# Patient Record
Sex: FEMALE | Race: WHITE | NOT HISPANIC OR LATINO | Employment: OTHER | URBAN - METROPOLITAN AREA
[De-identification: names, ages, dates, MRNs, and addresses within clinical notes are randomized per-mention and may not be internally consistent; named-entity substitution may affect disease eponyms.]

---

## 2023-10-01 ENCOUNTER — HOSPITAL ENCOUNTER (OUTPATIENT)
Facility: CLINIC | Age: 68
Setting detail: OBSERVATION
Discharge: HOME | End: 2023-10-02
Attending: EMERGENCY MEDICINE | Admitting: HOSPITALIST

## 2023-10-01 ENCOUNTER — APPOINTMENT (OUTPATIENT)
Dept: ULTRASOUND IMAGING | Facility: CLINIC | Age: 68
End: 2023-10-01
Attending: EMERGENCY MEDICINE

## 2023-10-01 DIAGNOSIS — N93.9 VAGINAL BLEEDING: ICD-10-CM

## 2023-10-01 DIAGNOSIS — R93.89 ENDOMETRIAL THICKENING ON ULTRASOUND: ICD-10-CM

## 2023-10-01 DIAGNOSIS — D25.9 UTERINE LEIOMYOMA, UNSPECIFIED LOCATION: ICD-10-CM

## 2023-10-01 LAB
ABO/RH(D): NORMAL
ALBUMIN SERPL BCG-MCNC: 4.5 G/DL (ref 3.5–5.2)
ALP SERPL-CCNC: 80 U/L (ref 35–104)
ALT SERPL W P-5'-P-CCNC: 13 U/L (ref 0–50)
ANION GAP BLD CALCULATED.3IONS-SCNC: 18 MMOL/L (ref 3–14)
ANION GAP SERPL CALCULATED.3IONS-SCNC: 13 MMOL/L (ref 7–15)
ANTIBODY SCREEN: NEGATIVE
AST SERPL W P-5'-P-CCNC: 17 U/L (ref 0–45)
BASOPHILS # BLD AUTO: 0.1 10E3/UL (ref 0–0.2)
BASOPHILS NFR BLD AUTO: 1 %
BILIRUB SERPL-MCNC: 0.3 MG/DL
BUN SERPL-MCNC: 13 MG/DL (ref 7–30)
BUN SERPL-MCNC: 13.2 MG/DL (ref 8–23)
CA-I BLD-MCNC: 4.5 MG/DL (ref 4.4–5.2)
CALCIUM SERPL-MCNC: 9.2 MG/DL (ref 8.8–10.2)
CHLORIDE BLD-SCNC: 99 MMOL/L (ref 94–109)
CHLORIDE SERPL-SCNC: 93 MMOL/L (ref 98–107)
CO2 BLD-SCNC: 22 MMOL/L (ref 20–32)
CREAT BLD-MCNC: 0.9 MG/DL (ref 0.5–1)
CREAT SERPL-MCNC: 0.76 MG/DL (ref 0.51–0.95)
DEPRECATED HCO3 PLAS-SCNC: 25 MMOL/L (ref 22–29)
EGFRCR SERPLBLD CKD-EPI 2021: 84 ML/MIN/1.73M2
EOSINOPHIL # BLD AUTO: 0.3 10E3/UL (ref 0–0.7)
EOSINOPHIL NFR BLD AUTO: 4 %
ERYTHROCYTE [DISTWIDTH] IN BLOOD BY AUTOMATED COUNT: 12.3 % (ref 10–15)
ERYTHROCYTE [DISTWIDTH] IN BLOOD BY AUTOMATED COUNT: 12.5 % (ref 10–15)
ETHANOL SERPL-MCNC: 0.21 G/DL
GLUCOSE BLD-MCNC: 102 MG/DL (ref 70–99)
GLUCOSE SERPL-MCNC: 119 MG/DL (ref 70–99)
HCT VFR BLD AUTO: 32.7 % (ref 35–47)
HCT VFR BLD AUTO: 35.7 % (ref 35–47)
HCT VFR BLD CALC: 32 % (ref 35–47)
HEMOCCULT STL QL: NEGATIVE
HGB BLD-MCNC: 10.8 G/DL (ref 11.7–15.7)
HGB BLD-MCNC: 10.8 G/DL (ref 11.7–15.7)
HGB BLD-MCNC: 10.9 G/DL (ref 11.7–15.7)
HGB BLD-MCNC: 12.1 G/DL (ref 11.7–15.7)
HOLD SPECIMEN: NORMAL
IMM GRANULOCYTES # BLD: 0 10E3/UL
IMM GRANULOCYTES NFR BLD: 0 %
LYMPHOCYTES # BLD AUTO: 4.2 10E3/UL (ref 0.8–5.3)
LYMPHOCYTES NFR BLD AUTO: 54 %
MAGNESIUM SERPL-MCNC: 1.7 MG/DL (ref 1.7–2.3)
MCH RBC QN AUTO: 31.7 PG (ref 26.5–33)
MCH RBC QN AUTO: 32 PG (ref 26.5–33)
MCHC RBC AUTO-ENTMCNC: 33 G/DL (ref 31.5–36.5)
MCHC RBC AUTO-ENTMCNC: 33.9 G/DL (ref 31.5–36.5)
MCV RBC AUTO: 94 FL (ref 78–100)
MCV RBC AUTO: 96 FL (ref 78–100)
MONOCYTES # BLD AUTO: 0.7 10E3/UL (ref 0–1.3)
MONOCYTES NFR BLD AUTO: 9 %
NEUTROPHILS # BLD AUTO: 2.5 10E3/UL (ref 1.6–8.3)
NEUTROPHILS NFR BLD AUTO: 32 %
NRBC # BLD AUTO: 0 10E3/UL
NRBC BLD AUTO-RTO: 0 /100
PHOSPHATE SERPL-MCNC: 2.7 MG/DL (ref 2.5–4.5)
PLATELET # BLD AUTO: 238 10E3/UL (ref 150–450)
PLATELET # BLD AUTO: 251 10E3/UL (ref 150–450)
POTASSIUM BLD-SCNC: 3.6 MMOL/L (ref 3.4–5.3)
POTASSIUM SERPL-SCNC: 3.8 MMOL/L (ref 3.4–5.3)
PROT SERPL-MCNC: 7.1 G/DL (ref 6.4–8.3)
RBC # BLD AUTO: 3.41 10E6/UL (ref 3.8–5.2)
RBC # BLD AUTO: 3.78 10E6/UL (ref 3.8–5.2)
SODIUM BLD-SCNC: 134 MMOL/L (ref 133–144)
SODIUM SERPL-SCNC: 131 MMOL/L (ref 135–145)
SODIUM SERPL-SCNC: 138 MMOL/L (ref 135–145)
SPECIMEN EXPIRATION DATE: NORMAL
TROPONIN T SERPL HS-MCNC: <6 NG/L
WBC # BLD AUTO: 7.4 10E3/UL (ref 4–11)
WBC # BLD AUTO: 7.8 10E3/UL (ref 4–11)

## 2023-10-01 PROCEDURE — 258N000003 HC RX IP 258 OP 636: Performed by: STUDENT IN AN ORGANIZED HEALTH CARE EDUCATION/TRAINING PROGRAM

## 2023-10-01 PROCEDURE — 82272 OCCULT BLD FECES 1-3 TESTS: CPT | Performed by: EMERGENCY MEDICINE

## 2023-10-01 PROCEDURE — 258N000003 HC RX IP 258 OP 636: Performed by: EMERGENCY MEDICINE

## 2023-10-01 PROCEDURE — 93005 ELECTROCARDIOGRAM TRACING: CPT

## 2023-10-01 PROCEDURE — 85025 COMPLETE CBC W/AUTO DIFF WBC: CPT | Performed by: EMERGENCY MEDICINE

## 2023-10-01 PROCEDURE — 84100 ASSAY OF PHOSPHORUS: CPT | Performed by: STUDENT IN AN ORGANIZED HEALTH CARE EDUCATION/TRAINING PROGRAM

## 2023-10-01 PROCEDURE — 99285 EMERGENCY DEPT VISIT HI MDM: CPT | Mod: 25

## 2023-10-01 PROCEDURE — 85018 HEMOGLOBIN: CPT

## 2023-10-01 PROCEDURE — 250N000013 HC RX MED GY IP 250 OP 250 PS 637: Performed by: STUDENT IN AN ORGANIZED HEALTH CARE EDUCATION/TRAINING PROGRAM

## 2023-10-01 PROCEDURE — 36415 COLL VENOUS BLD VENIPUNCTURE: CPT | Performed by: STUDENT IN AN ORGANIZED HEALTH CARE EDUCATION/TRAINING PROGRAM

## 2023-10-01 PROCEDURE — 99223 1ST HOSP IP/OBS HIGH 75: CPT | Performed by: HOSPITALIST

## 2023-10-01 PROCEDURE — 36415 COLL VENOUS BLD VENIPUNCTURE: CPT | Performed by: EMERGENCY MEDICINE

## 2023-10-01 PROCEDURE — 85027 COMPLETE CBC AUTOMATED: CPT | Performed by: STUDENT IN AN ORGANIZED HEALTH CARE EDUCATION/TRAINING PROGRAM

## 2023-10-01 PROCEDURE — 84295 ASSAY OF SERUM SODIUM: CPT | Performed by: STUDENT IN AN ORGANIZED HEALTH CARE EDUCATION/TRAINING PROGRAM

## 2023-10-01 PROCEDURE — 84484 ASSAY OF TROPONIN QUANT: CPT | Performed by: EMERGENCY MEDICINE

## 2023-10-01 PROCEDURE — 99207 PR NO BILLABLE SERVICE THIS VISIT: CPT | Performed by: STUDENT IN AN ORGANIZED HEALTH CARE EDUCATION/TRAINING PROGRAM

## 2023-10-01 PROCEDURE — 258N000003 HC RX IP 258 OP 636: Performed by: HOSPITALIST

## 2023-10-01 PROCEDURE — G0378 HOSPITAL OBSERVATION PER HR: HCPCS

## 2023-10-01 PROCEDURE — 80047 BASIC METABLC PNL IONIZED CA: CPT

## 2023-10-01 PROCEDURE — 76856 US EXAM PELVIC COMPLETE: CPT

## 2023-10-01 PROCEDURE — 96361 HYDRATE IV INFUSION ADD-ON: CPT

## 2023-10-01 PROCEDURE — 86900 BLOOD TYPING SEROLOGIC ABO: CPT | Performed by: STUDENT IN AN ORGANIZED HEALTH CARE EDUCATION/TRAINING PROGRAM

## 2023-10-01 PROCEDURE — 83735 ASSAY OF MAGNESIUM: CPT | Performed by: STUDENT IN AN ORGANIZED HEALTH CARE EDUCATION/TRAINING PROGRAM

## 2023-10-01 PROCEDURE — 76830 TRANSVAGINAL US NON-OB: CPT

## 2023-10-01 PROCEDURE — 80053 COMPREHEN METABOLIC PANEL: CPT | Performed by: EMERGENCY MEDICINE

## 2023-10-01 PROCEDURE — 250N000013 HC RX MED GY IP 250 OP 250 PS 637: Performed by: EMERGENCY MEDICINE

## 2023-10-01 PROCEDURE — 82077 ASSAY SPEC XCP UR&BREATH IA: CPT | Performed by: EMERGENCY MEDICINE

## 2023-10-01 PROCEDURE — 36415 COLL VENOUS BLD VENIPUNCTURE: CPT

## 2023-10-01 PROCEDURE — 250N000013 HC RX MED GY IP 250 OP 250 PS 637: Performed by: HOSPITALIST

## 2023-10-01 PROCEDURE — 96360 HYDRATION IV INFUSION INIT: CPT

## 2023-10-01 RX ORDER — SODIUM CHLORIDE, SODIUM LACTATE, POTASSIUM CHLORIDE, CALCIUM CHLORIDE 600; 310; 30; 20 MG/100ML; MG/100ML; MG/100ML; MG/100ML
INJECTION, SOLUTION INTRAVENOUS CONTINUOUS
Status: DISCONTINUED | OUTPATIENT
Start: 2023-10-01 | End: 2023-10-01

## 2023-10-01 RX ORDER — PANTOPRAZOLE SODIUM 40 MG/1
40 TABLET, DELAYED RELEASE ORAL DAILY
COMMUNITY

## 2023-10-01 RX ORDER — ONDANSETRON 4 MG/1
4 TABLET, ORALLY DISINTEGRATING ORAL EVERY 6 HOURS PRN
Status: DISCONTINUED | OUTPATIENT
Start: 2023-10-01 | End: 2023-10-02 | Stop reason: HOSPADM

## 2023-10-01 RX ORDER — ACETAMINOPHEN 325 MG/1
650 TABLET ORAL EVERY 6 HOURS PRN
Status: DISCONTINUED | OUTPATIENT
Start: 2023-10-01 | End: 2023-10-02 | Stop reason: HOSPADM

## 2023-10-01 RX ORDER — SODIUM CHLORIDE, SODIUM LACTATE, POTASSIUM CHLORIDE, CALCIUM CHLORIDE 600; 310; 30; 20 MG/100ML; MG/100ML; MG/100ML; MG/100ML
INJECTION, SOLUTION INTRAVENOUS CONTINUOUS
Status: ACTIVE | OUTPATIENT
Start: 2023-10-01 | End: 2023-10-01

## 2023-10-01 RX ORDER — AMOXICILLIN 250 MG
2 CAPSULE ORAL 2 TIMES DAILY PRN
Status: DISCONTINUED | OUTPATIENT
Start: 2023-10-01 | End: 2023-10-02 | Stop reason: HOSPADM

## 2023-10-01 RX ORDER — ACETAMINOPHEN 500 MG
500 TABLET ORAL ONCE
Status: COMPLETED | OUTPATIENT
Start: 2023-10-01 | End: 2023-10-01

## 2023-10-01 RX ORDER — PANTOPRAZOLE SODIUM 40 MG/1
40 TABLET, DELAYED RELEASE ORAL
Status: DISCONTINUED | OUTPATIENT
Start: 2023-10-02 | End: 2023-10-02 | Stop reason: HOSPADM

## 2023-10-01 RX ORDER — ACETAMINOPHEN 650 MG/1
650 SUPPOSITORY RECTAL EVERY 6 HOURS PRN
Status: DISCONTINUED | OUTPATIENT
Start: 2023-10-01 | End: 2023-10-02 | Stop reason: HOSPADM

## 2023-10-01 RX ORDER — MULTIPLE VITAMINS W/ MINERALS TAB 9MG-400MCG
1 TAB ORAL DAILY
Status: DISCONTINUED | OUTPATIENT
Start: 2023-10-01 | End: 2023-10-02 | Stop reason: HOSPADM

## 2023-10-01 RX ORDER — FOLIC ACID 1 MG/1
1 TABLET ORAL DAILY
Status: DISCONTINUED | OUTPATIENT
Start: 2023-10-01 | End: 2023-10-02 | Stop reason: HOSPADM

## 2023-10-01 RX ORDER — POLYETHYLENE GLYCOL 3350 17 G/17G
17 POWDER, FOR SOLUTION ORAL DAILY PRN
Status: DISCONTINUED | OUTPATIENT
Start: 2023-10-01 | End: 2023-10-02 | Stop reason: HOSPADM

## 2023-10-01 RX ORDER — ONDANSETRON 2 MG/ML
4 INJECTION INTRAMUSCULAR; INTRAVENOUS EVERY 6 HOURS PRN
Status: DISCONTINUED | OUTPATIENT
Start: 2023-10-01 | End: 2023-10-02 | Stop reason: HOSPADM

## 2023-10-01 RX ORDER — AMOXICILLIN 250 MG
1 CAPSULE ORAL 2 TIMES DAILY PRN
Status: DISCONTINUED | OUTPATIENT
Start: 2023-10-01 | End: 2023-10-02 | Stop reason: HOSPADM

## 2023-10-01 RX ORDER — BISACODYL 10 MG
10 SUPPOSITORY, RECTAL RECTAL DAILY PRN
Status: DISCONTINUED | OUTPATIENT
Start: 2023-10-01 | End: 2023-10-02 | Stop reason: HOSPADM

## 2023-10-01 RX ADMIN — ACETAMINOPHEN 650 MG: 325 TABLET, FILM COATED ORAL at 10:31

## 2023-10-01 RX ADMIN — FOLIC ACID 1 MG: 1 TABLET ORAL at 10:29

## 2023-10-01 RX ADMIN — THIAMINE HCL TAB 100 MG 100 MG: 100 TAB at 10:29

## 2023-10-01 RX ADMIN — SODIUM CHLORIDE 1000 ML: 9 INJECTION, SOLUTION INTRAVENOUS at 03:17

## 2023-10-01 RX ADMIN — SODIUM CHLORIDE 1000 ML: 9 INJECTION, SOLUTION INTRAVENOUS at 05:07

## 2023-10-01 RX ADMIN — ACETAMINOPHEN 500 MG: 500 TABLET, FILM COATED ORAL at 06:05

## 2023-10-01 RX ADMIN — MULTIPLE VITAMINS W/ MINERALS TAB 1 TABLET: TAB at 10:29

## 2023-10-01 RX ADMIN — SODIUM CHLORIDE, POTASSIUM CHLORIDE, SODIUM LACTATE AND CALCIUM CHLORIDE: 600; 310; 30; 20 INJECTION, SOLUTION INTRAVENOUS at 06:05

## 2023-10-01 RX ADMIN — SODIUM CHLORIDE, POTASSIUM CHLORIDE, SODIUM LACTATE AND CALCIUM CHLORIDE: 600; 310; 30; 20 INJECTION, SOLUTION INTRAVENOUS at 14:07

## 2023-10-01 ASSESSMENT — ACTIVITIES OF DAILY LIVING (ADL)
ADLS_ACUITY_SCORE: 33
ADLS_ACUITY_SCORE: 35
ADLS_ACUITY_SCORE: 31
ADLS_ACUITY_SCORE: 35
ADLS_ACUITY_SCORE: 31
ADLS_ACUITY_SCORE: 35
ADLS_ACUITY_SCORE: 31

## 2023-10-01 NOTE — PLAN OF CARE
ROOM # 203    Living Situation (if not independent, order SW consult): home with   Facility name:  : daughter [place] [phone]    Activity level at baseline: ind  Activity level on admit: A1/ SBA    Who will be transporting you at discharge: daughter    Patient registered to observation; given Patient Bill of Rights; given the opportunity to ask questions about observation status and their plan of care.  Patient has been oriented to the observation room, bathroom and call light is in place.    Discussed discharge goals and expectations with patient/family.

## 2023-10-01 NOTE — PLAN OF CARE
PRIMARY DIAGNOSIS: GI BLEED    OUTPATIENT/OBSERVATION GOALS TO BE MET BEFORE DISCHARGE  Orthostatic performed: No    Stable Hgb no  Recent Labs   Lab Test 10/01/23  1045 10/01/23  0520 10/01/23  0301   HGB 10.8* 10.9* 12.1       Resolved or declined bleeding episodes: Yes Last episode: 0845 - scant blood on toilet paper    Appropriate testing complete: No    Cleared for discharge by consultants (if involved): No    Safe discharge environment identified: Yes    Discharge Planner Nurse   Safe discharge environment identified: Yes  Barriers to discharge: Yes       Entered by: Ewa Maldonado RN 10/01/2023    Pt A&Ox4, calm and cooperative. SBA. PIV LR continuous 100/hr. RA. Currently has had 1 episode of scant bleeding.  Tylenol given for headache. Negative CIWA. Na WDL at 138. Hgb 10.8 ( 12.1 in ED).   OBgyn to see tomorrow 10/2.  Please review provider order for any additional goals.   Nurse to notify provider when observation goals have been met and patient is ready for discharge.

## 2023-10-01 NOTE — PROGRESS NOTES
[facility]    Medicine Progress Note - Hospitalist Service    Date of Admission:  10/1/2023    Assessment & Plan     Paula Pierson is a pleasant 68-year-old postmenopausal female with no significant past medical history presented to the ED with spontaneous vaginal bleeding and admitted to the hospital for further evaluation.       Postmenopausal vaginal bleeding  Intramural lipomleiomyoma  Thickened endometrium  She is postmenopausal for about 15 years now.  She did not have vaginal bleeding before.  It came on suddenly.  She denied any sexual intercourse prior to vaginal bleeding.  She denied any estrogen therapy.  She had menarche at 11 years of age.  Her maternal grandmother had uterine cancer and her brother diagnosed with colon cancer about 2 years ago.  Differential include endometrial hyperplasia, endometrial cancer, leiomyoma.  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 Hemoglobin on admission was 12.1 decreased to 10.8.  She became hypotensive for some time but improved quickly with IV fluids.  Discussed her case with OB/GYN.  Likely outpatient work-up like D&C/endometrial biopsy unless she decompensates clinically.     -OB/GYN consult placed, would likely see patient tomorrow  -Continue to monitor hemoglobin daily  -Low threshold to check hemoglobin stat if any hemodynamic instability      Hypotension-------- improved  She dropped her blood pressure to 85/45 improved with IVF.    -S/p 2 L of IVF  -Continue IVF at 125 mils per hour for 6 hours    Hypovolemic hyponatremia----improved  Sodium at 131 on admission.  Unclear baseline but likely acute. she appeared volume depleted on exam which is likely from alcohol ingestion earlier this evening.  Received 2 L of normal saline in the ED. sodium improved to 138    #Alcohol intoxication  On admission alcohol level was 0.21.  Reported having 6 beers before  "coming to the hospital.  She states that she drinks alcohol occasionally and denied daily alcohol use.  No prior history of alcohol withdrawal    -CIWA score monitoring  -Thiamine and folate supplementation      Chronic medical issue  GERD  -Home pantoprazole 40 mg daily       Diet: NPO for Medical/Clinical Reasons Except for: Ice Chips    DVT Prophylaxis: Pneumatic Compression Devices  Fairchild Catheter: Not present  Lines: None     Cardiac Monitoring: None  Code Status: Full Code      Clinically Significant Risk Factors Present on Admission                       # Overweight: Estimated body mass index is 27.12 kg/m  as calculated from the following:    Height as of this encounter: 1.676 m (5' 6\").    Weight as of this encounter: 76.2 kg (168 lb).              Disposition Plan   Likely home . Pending hemodynamic and hemoglobin stability.  Pending OB/GYN evaluation.  DARRICK likely 10/2/2023     Expected Discharge Date: 10/02/2023                  Wendy Morelos MD  Hospitalist Service  [facility]  Securely message with [employer] (more info)  Text page via [employer] Paging/Directory   ______________________________________________________________________    Interval History     Admitted overnight.  Reported feeling sore around her vagina.  Denied any abdominal pain or cramps.  Denied any use of estrogen therapy.  Denied any sexual intercourse prior to the onset of vaginal bleeding.  Family history significant for uterine cancer in paternal grandmother and colon cancer in brother.     10 point review of system is negative.     Physical Exam   Vital Signs: Temp: 98.2  F (36.8  C) Temp src: Oral BP: 119/62 Pulse: 68   Resp: 16 SpO2: 93 % O2 Device: None (Room air)    Weight: 168 lbs 0 oz    Constitutional: awake, alert, cooperative, no apparent distress, and appears stated age, appears tired  Respiratory: No increased work of breathing, good air exchange, clear to auscultation bilaterally, no crackles or " wheezing  Cardiovascular: Normal apical impulse, regular rate and rhythm, normal S1 and S2, no S3 or S4, and no murmur noted  GI: Soft, nontender, nondistended  Skin: Albin skin appears within normal limit  Neurologic: Awake, alert, oriented to name, place and time.   Neuropsychiatric: Pleasant engaging in conversation    Medical Decision Making       40 MINUTES SPENT BY ME on the date of service doing chart review, history, exam, documentation & further activities per the note.        Data     I have personally reviewed the following data over the past 24 hrs:    7.4  \   10.8 (L)   / 238     138 99 13 /  102 (H)   3.6 25 0.9 \     ALT: 13 AST: 17 AP: 80 TBILI: 0.3   ALB: 4.5 TOT PROTEIN: 7.1 LIPASE: N/A     Trop: <6 BNP: N/A

## 2023-10-01 NOTE — ED NOTES
[facility]  ED Nurse Handoff Report    ED Chief complaint: Alcohol Intoxication and Vaginal Bleeding  . ED Diagnosis:   Final diagnoses:   Uterine leiomyoma, unspecified location       Allergies: No Known Allergies    Code Status: Full Code    Activity level - Baseline/Home:  independent.  Activity Level - Current:   assist of 1.   Lift room needed: No.   Bariatric: No   Needed: No   Isolation: No.   Infection: Not Applicable.     Respiratory status: Room air    Vital Signs (within 30 minutes):   Vitals:    10/01/23 0515 10/01/23 0516 10/01/23 0517 10/01/23 0523   BP: 98/49      Pulse: 70 67 64 65   Resp: 17 22 13 15   Temp:       TempSrc:       SpO2: 93% 93% 96% 92%   Weight:       Height:           Cardiac Rhythm:  ,      Pain level:    Patient confused: No.   Patient Falls Risk: patient and family education.   Elimination Status: Has voided     Patient Report - Initial Complaint: .BIBA. Pt reports she went to the bathroom and noted bleeding either from the rectum or her vagina. Pt reports drinking 5 beers tonight. Pt denies pain. Pt denies blood thinners. Pt is from [place] and is visiting her [facility]   Focused Assessment: pt bleeding from the vagina. Pt denies abd pain.     Abnormal Results:   Labs Ordered and Resulted from Time of ED Arrival to Time of ED Departure   COMPREHENSIVE METABOLIC PANEL - Abnormal       Result Value    Sodium 131 (*)     Potassium 3.8      Carbon Dioxide (CO2) 25      Anion Gap 13      Urea Nitrogen 13.2      Creatinine 0.76      GFR Estimate 84      Calcium 9.2      Chloride 93 (*)     Glucose 119 (*)     Alkaline Phosphatase 80      AST 17      ALT 13      Protein Total 7.1      Albumin 4.5      Bilirubin Total 0.3     ETHYL ALCOHOL LEVEL - Abnormal    Alcohol ethyl 0.21 (*)    CBC WITH PLATELETS AND DIFFERENTIAL - Abnormal    WBC Count 7.8      RBC Count 3.78 (*)     Hemoglobin 12.1      Hematocrit 35.7      MCV 94      MCH 32.0      MCHC 33.9       RDW 12.3      Platelet Count 251      % Neutrophils 32      % Lymphocytes 54      % Monocytes 9      % Eosinophils 4      % Basophils 1      % Immature Granulocytes 0      NRBCs per 100 WBC 0      Absolute Neutrophils 2.5      Absolute Lymphocytes 4.2      Absolute Monocytes 0.7      Absolute Eosinophils 0.3      Absolute Basophils 0.1      Absolute Immature Granulocytes 0.0      Absolute NRBCs 0.0     ISTAT BASIC CHEM ICA HEMATOCRIT POCT - Abnormal    Chloride POCT 99      Potassium POCT 3.6      Sodium POCT 134      UREA NITROGEN POCT 13      Calcium, Ionized Whole Blood POCT 4.5      Glucose Whole Blood POCT 102 (*)     Anion Gap POCT 18.0 (*)     Hemoglobin POCT 10.9 (*)     Hematocrit POCT 32 (*)     Creatinine POCT 0.9      TOTAL CO2 POCT 22     TROPONIN T, HIGH SENSITIVITY - Normal    Troponin T, High Sensitivity <6     OCCULT BLOOD STOOL - Normal    Occult Blood Negative          US Pelvic Complete with Transvaginal   Final Result   IMPRESSION:   1.  Endometrium is abnormally thickened for postmenopausal status. This may be on the basis of hyperplasia. Follow-up recommended to exclude a proliferative process.   2.  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                   Treatments provided: 2 liters normal saline  Family Comments:   OBS brochure/video discussed/provided to patient:  Yes  ED Medications:   Medications   sodium chloride 0.9% BOLUS 1,000 mL (1,000 mLs Intravenous $New Bag 10/1/23 8183)   sodium chloride 0.9% BOLUS 1,000 mL (0 mLs Intravenous Stopped 10/1/23 3880)       Drips infusing:  No  For the majority of the shift this patient was Green.   Interventions performed were .    Sepsis treatment initiated: No    Cares/treatment/interventions/medications to be completed following ED care: all admit orders    ED Nurse Name: Susan Escalona RN  5:28 AM  RECEIVING UNIT ED HANDOFF REVIEW    Above ED Nurse Handoff Report was  reviewed: Yes  Reviewed by: Ewa Maldonado RN on October 1, 2023 at 7:48 AM

## 2023-10-01 NOTE — ED PROVIDER NOTES
History     Chief Complaint:  Alcohol Intoxication and Vaginal Bleeding       HPI   Paula Pierson is a 68 year old female with hx of acid reflux sent to the ER for bleeding.  She is concerned for possible vaginal versus rectal bleeding.  She states that earlier tonight she was out with her family.  She had a few beers.  States that when they got home she had an episode of diarrhea and then noted a gush of blood.  Daughter states that it is over 2 cups of blood.  Patient denies taking any blood thinners.  States she has not had GI bleeding past.  States that she is postmenopausal for almost 20 years.  She states that she has not had vaginal bleeding previously.  She reports no associate abdominal pain or rectal pain.      Independent Historian:   Daughter - They report history    Review of External Notes:   No Prior history available      Medications:    No current outpatient medications on file.      Past Medical History:    No past medical history on file.    Past Surgical History:    No past surgical history on file.     Physical Exam   Patient Vitals for the past 24 hrs:   BP Temp Temp src Pulse Resp SpO2 Height Weight   10/01/23 0531 -- -- -- 68 15 93 % -- --   10/01/23 0530 95/51 -- -- 68 15 94 % -- --   10/01/23 0523 -- -- -- 65 15 92 % -- --   10/01/23 0517 -- -- -- 64 13 96 % -- --   10/01/23 0516 -- -- -- 67 22 93 % -- --   10/01/23 0515 98/49 -- -- 70 17 93 % -- --   10/01/23 0505 -- -- -- 63 15 97 % -- --   10/01/23 0504 -- -- -- 63 17 94 % -- --   10/01/23 0503 -- -- -- 63 14 93 % -- --   10/01/23 0502 -- -- -- 63 15 93 % -- --   10/01/23 0501 -- -- -- 64 15 95 % -- --   10/01/23 0500 (!) 89/53 -- -- 62 16 97 % -- --   10/01/23 0459 97/55 -- -- 73 19 90 % -- --   10/01/23 0441 -- -- -- 60 18 93 % -- --   10/01/23 0413 -- -- -- 63 25 96 % -- --   10/01/23 0403 -- -- -- 64 11 95 % -- --   10/01/23 0402 -- -- -- 65 17 95 % -- --   10/01/23 0401 -- -- -- 66 19 94 % -- --   10/01/23 0400 132/60 -- -- 69 10  "95 % -- --   10/01/23 0332 -- -- -- 61 15 94 % -- --   10/01/23 0331 -- -- -- 62 15 95 % -- --   10/01/23 0330 124/69 -- -- 61 10 95 % -- --   10/01/23 0257 (!) 150/99 97.6  F (36.4  C) Oral 61 16 97 % 1.676 m (5' 6\") 76.2 kg (168 lb)        Physical Exam  Vitals reviewed.   Constitutional:       General: She is not in acute distress.     Appearance: She is not ill-appearing.   HENT:      Head: Normocephalic and atraumatic.   Eyes:      Extraocular Movements: Extraocular movements intact.   Cardiovascular:      Rate and Rhythm: Normal rate and regular rhythm.   Pulmonary:      Effort: Pulmonary effort is normal. No respiratory distress.      Breath sounds: Normal breath sounds. No wheezing.   Abdominal:      Palpations: Abdomen is soft.      Tenderness: There is no abdominal tenderness. There is no guarding.   Genitourinary:     Comments: Rectal exam performed with Susan cooney.  There is a nonthrombosed external hemorrhoid.  Stool appears brown in color.  External vaginal exam performed that showed a clot of blood in the vaginal opening.  No active bleeding.  Musculoskeletal:      Cervical back: Normal range of motion.   Skin:     General: Skin is warm and dry.   Neurological:      Mental Status: She is alert and oriented to person, place, and time.      GCS: GCS eye subscore is 4. GCS verbal subscore is 5. GCS motor subscore is 6.   Psychiatric:         Behavior: Behavior normal.           Emergency Department Course   ECG  ECG taken at 0301, ECG read at 0301  Sinus bradycardia  No prior EKGs to compare  Rate 57 bpm. NC interval 200 ms. QRS duration 82 ms. QT/QTc 424/412 ms. P-R-T axes 61 33 52.     Imaging:  US Pelvic Complete with Transvaginal   Final Result   IMPRESSION:   1.  Endometrium is abnormally thickened for postmenopausal status. This may be on the basis of hyperplasia. Follow-up recommended to exclude a proliferative process.   2.  4.3 cm intramural lipoleiomyoma of the right uterine fundus may " have small surface of submucosal extension.                  Report per radiology    Laboratory:  Labs Ordered and Resulted from Time of ED Arrival to Time of ED Departure   COMPREHENSIVE METABOLIC PANEL - Abnormal       Result Value    Sodium 131 (*)     Potassium 3.8      Carbon Dioxide (CO2) 25      Anion Gap 13      Urea Nitrogen 13.2      Creatinine 0.76      GFR Estimate 84      Calcium 9.2      Chloride 93 (*)     Glucose 119 (*)     Alkaline Phosphatase 80      AST 17      ALT 13      Protein Total 7.1      Albumin 4.5      Bilirubin Total 0.3     ETHYL ALCOHOL LEVEL - Abnormal    Alcohol ethyl 0.21 (*)    CBC WITH PLATELETS AND DIFFERENTIAL - Abnormal    WBC Count 7.8      RBC Count 3.78 (*)     Hemoglobin 12.1      Hematocrit 35.7      MCV 94      MCH 32.0      MCHC 33.9      RDW 12.3      Platelet Count 251      % Neutrophils 32      % Lymphocytes 54      % Monocytes 9      % Eosinophils 4      % Basophils 1      % Immature Granulocytes 0      NRBCs per 100 WBC 0      Absolute Neutrophils 2.5      Absolute Lymphocytes 4.2      Absolute Monocytes 0.7      Absolute Eosinophils 0.3      Absolute Basophils 0.1      Absolute Immature Granulocytes 0.0      Absolute NRBCs 0.0     ISTAT BASIC CHEM ICA HEMATOCRIT POCT - Abnormal    Chloride POCT 99      Potassium POCT 3.6      Sodium POCT 134      UREA NITROGEN POCT 13      Calcium, Ionized Whole Blood POCT 4.5      Glucose Whole Blood POCT 102 (*)     Anion Gap POCT 18.0 (*)     Hemoglobin POCT 10.9 (*)     Hematocrit POCT 32 (*)     Creatinine POCT 0.9      TOTAL CO2 POCT 22     TROPONIN T, HIGH SENSITIVITY - Normal    Troponin T, High Sensitivity <6     OCCULT BLOOD STOOL - Normal    Occult Blood Negative          Procedures       Emergency Department Course & Assessments:             Interventions:  Medications   sodium chloride 0.9% BOLUS 1,000 mL (1,000 mLs Intravenous $New Bag 10/1/23 0020)   sodium chloride 0.9% BOLUS 1,000 mL (0 mLs Intravenous Stopped  10/1/23 0427)        Assessments:  See ED course    Independent Interpretation (X-rays, CTs, rhythm strip):  None    Consultations/Discussion of Management or Tests:  Hospitalist   ED Course as of 10/01/23 0549   Sun Oct 01, 2023   0328 Hemoglobin: 12.1   0332 Alcohol ethyl(!): 0.21   0332 Hemoglobin: 12.1   0335 Updated patient and family on results.   0335 Ultrasound pending.   0513 UpDated patient on results.  Also called daughter   0533 Updated daughterleda.    0553 Discussed case with Dr Sloan       Social Determinants of Health affecting care:   None and Pt visiting from [place]    Disposition:  The patient was admitted to the hospital under the care of Dr. Sloan.     Impression & Plan        Medical Decision Makin-year-old female presents today with vaginal bleeding.  She is noted to have blood coming out of her vagina on exam.  She has no evidence of GI bleeding at this time.  Abdomen soft nontender all throughout.  No prior history of this.  She does admit to having 5-6 beers earlier tonight.  Her alcohol level was 0.2.  No daily alcohol use.  Family states that patient had approximately 2 cups of blood in the bathroom.  Her hemoglobin on arrival was 12.  An ultrasound was done as noted above.  While in the ER her blood pressure was trending down.  She was given 2 L IV fluids.  Repeat hemoglobin was 10 maybe dilutional. discussed plan for observation with the patient and daughter.  Case was discussed with hospitalist.      Diagnosis:    ICD-10-CM    1. Uterine leiomyoma, unspecified location  D25.9       2. Vaginal bleeding  N93.9       3. Endometrial thickening on ultrasound  R93.89            Discharge Medications:  New Prescriptions    No medications on file          Florence Soto DO  10/1/2023   Florence Soto DO Doan, Tiffani, DO  10/01/23 0551

## 2023-10-01 NOTE — H&P
"[facility]  Hospitalist H&P    Name: Paula Pierson      MRN: [id number]  YOB: 1955    Age: 68 year old  Date of admission: 10/1/2023  Primary care provider: No primary care provider on file.            Assessment and Plan:     Paula Pierson is a 68 year old female with no medical history who presents with vaginal bleeding.    Vaginal bleeding: This is the first time she has had this issue.  She denies any other bleeding or bruising.  She is not on blood thinners or anticoagulants.  She has been postmenopausal now for about 15 years.  Pelvic ultrasound shows that the endometrium is abnormally thickened which could be on the basis of hyperplasia.  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  For now we will admit her to observation.  I would like to request OB/GYN consultation for further recommendations and evaluation.  Hypovolemic hyponatremia: Sodium is slightly low.  She appears volume depleted, likely from alcohol ingestion earlier this evening.  We will start lactated Ringer's at 100 cc/h.  Hypotension: Initial blood pressure was normal but she is gradually becoming more hypotensive currently at 95/51.  I suspect this is due to volume depletion.  We will continue lactated Ringer.  She does not appear to be in hemorrhagic shock.    Clinically Significant Risk Factors Present on Admission                       # Overweight: Estimated body mass index is 27.12 kg/m  as calculated from the following:    Height as of this encounter: 1.676 m (5' 6\").    Weight as of this encounter: 76.2 kg (168 lb).              Code status: Full.  Admit to observation status.  Prophylaxis: None.  Disposition: Likely home later today after OB/GYN consult.    60 MINUTES SPENT BY ME on the date of service doing chart review, history, exam, documentation & further activities per the note.          Chief " Complaint:     Vaginal bleeding.         History of Present Illness:   Paula Pierson is a 68 year old female who presents with vaginal bleeding.  History was obtained from my discussion with the patient at the bedside.  I also discussed the case with the ED provider.  The electronic medical record was also reviewed.    The patient is here visiting from [place].  She was out to dinner with some friends and drank several alcoholic beverages.  She went to the bathroom and noticed a gush of red blood come from her vagina.  She has never had this issue before.  Initially it was not clear if this was rectal bleeding or vaginal bleeding.  Later became more obvious that it was vaginal.  When she got home she had an episode of diarrhea and then noticed another gush of blood from her vagina.  She estimated that this was about 2 cups of blood.  She is not on blood thinners nor antiplatelet medication.  She does not take frequent ibuprofen.  She is not a daily drinker.  She is healthy and not on any medications chronically.  Due to these issues she comes to the ED for evaluation.    Here in the emergency department her temperature is 97.6, heart rate 61, blood pressure 150/99, respirate 16 and oxygen saturation 97% on room air.  Labs show sodium of 131 and a chloride of 93.  Remainder the BMP, liver function test, and troponin are normal.  CBC is normal.  Fecal occult blood is negative.  Pelvic ultrasound is shown below.  Blood alcohol 0.21.            Past Medical History:   No past medical history on file.          Past Surgical History:   No past surgical history on file.          Social History:     Social History     Tobacco Use    Smoking status: Not on file    Smokeless tobacco: Not on file   Substance Use Topics    Alcohol use: Not on file             Family History:   The family history was fully reviewed and non-contributory in this case.         Allergies:   No Known Allergies          Medications:     Prior to  "Admission medications    Not on File             Review of Systems:     A Comprehensive greater than 10 system review of systems was carried out.  Pertinent positives and negatives are noted above.  Otherwise negative for contributory information.           Physical Exam:   Blood pressure 95/51, pulse 68, temperature 97.6  F (36.4  C), temperature source Oral, resp. rate 15, height 1.676 m (5' 6\"), weight 76.2 kg (168 lb), SpO2 93 %.  Wt Readings from Last 1 Encounters:   10/01/23 76.2 kg (168 lb)     Exam:  GENERAL: No apparent distress. Awake, alert, and fully oriented.  HEENT: Normocephalic, atraumatic. Extraocular movements intact.  CARDIOVASCULAR: Regular rate and rhythm without murmurs or rubs. No S3.  PULMONARY: Clear to auscultation bilaterally.  ABDOMINAL: Soft, non-tender, non-distended. Bowel sounds normoactive.   EXTREMITIES: No cyanosis or clubbing. No appreciable edema.  NEUROLOGICAL: CN 2-12 grossly intact, no focal neurological deficits.  DERMATOLOGICAL: No rash, ulcer, bruising, nor jaundice.          Data:   Laboratory:  Recent Labs   Lab 10/01/23  0520 10/01/23  0301   WBC  --  7.8   HGB 10.9* 12.1   HCT 32* 35.7   MCV  --  94   PLT  --  251     Recent Labs   Lab 10/01/23  0520 10/01/23  0301    131*   POTASSIUM 3.6 3.8   CHLORIDE 99 93*   CO2  --  25   ANIONGAP  --  13   * 119*   BUN 13 13.2   CR 0.9 0.76   GFRESTIMATED  --  84   MARTHA  --  9.2     No results for input(s): CULT in the last 168 hours.    Imaging:  Recent Results (from the past 24 hour(s))   US Pelvic Complete with Transvaginal    Narrative    EXAM: US PELVIC TRANSABDOMINAL AND TRANSVAGINAL  LOCATION: [facility]  DATE: 10/1/2023    INDICATION: vaginal bleeding  COMPARISON: None.  TECHNIQUE: Transabdominal scans were performed. Endovaginal ultrasound was performed to better visualize the adnexa.    FINDINGS:    UTERUS: 5.3 x 5.0 x 4.9 cm. 4.3 cm uniformly echogenic solid mass centered in right " fundal myometrium is most consistent with lipoleiomyoma. The lipoleiomyoma may have small surface of submucosal extension.    ENDOMETRIUM: Abnormally thickened and mildly heterogeneous measuring up to 12.6 mm wide. A discrete endometrial mass is not visualized.    RIGHT OVARY: 1.9 x 1.3 x 1.1 cm. Normal.    LEFT OVARY: 1.8 x 1.2 x 0.9 cm. Normal.    No significant free fluid.      Impression    IMPRESSION:  1.  Endometrium is abnormally thickened for postmenopausal status. This may be on the basis of hyperplasia. Follow-up recommended to exclude a proliferative process.  2.  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             Clark Sloan DO MPH  [facility] Hospitalist  [address] [place] [address]  10/01/2023

## 2023-10-01 NOTE — PHARMACY-ADMISSION MEDICATION HISTORY
Pharmacist Admission Medication History    Admission medication history is complete. The information provided in this note is only as accurate as the sources available at the time of the update.    Medication reconciliation/reorder completed by provider prior to medication history? No    Information Source(s): Patient via in-person    Pertinent Information: None    Changes made to PTA medication list:  Added: all  Deleted: None  Changed: None    Medication Affordability:       Allergies reviewed with patient and updates made in EHR: yes    Medication History Completed By: Velvet Black RPH 10/1/2023 7:49 AM    Prior to Admission medications    Medication Sig Last Dose Taking? Auth Provider Long Term End Date   pantoprazole (PROTONIX) 40 MG EC tablet Take 40 mg by mouth daily 9/30/2023 at am Yes Unknown, Entered By History     UNABLE TO FIND Place 1 drop into both eyes every morning MEDICATION NAME: Duotrav eye drops 9/30/2023 at am Yes Unknown, Entered By History

## 2023-10-01 NOTE — ED TRIAGE NOTES
BIBA. Pt reports she went to the bathroom and noted bleeding either from the rectum or her vagina. Pt reports drinking 3-10 beers tonight. Pt denies pain. Pt denies blood thinners. Pt is from [place] and is visiting her [facility]     Triage Assessment       Row Name 10/01/23 0255       Triage Assessment (Adult)    Airway WDL WDL       Respiratory WDL    Respiratory WDL WDL       Skin Circulation/Temperature WDL    Skin Circulation/Temperature WDL WDL       Cardiac WDL    Cardiac WDL WDL       Peripheral/Neurovascular WDL    Peripheral Neurovascular WDL WDL       Cognitive/Neuro/Behavioral WDL    Cognitive/Neuro/Behavioral WDL WDL

## 2023-10-01 NOTE — PLAN OF CARE
PRIMARY DIAGNOSIS: GI BLEED    OUTPATIENT/OBSERVATION GOALS TO BE MET BEFORE DISCHARGE  Orthostatic performed: No    Stable Hgb no  Recent Labs   Lab Test 10/01/23  1045 10/01/23  0520 10/01/23  0301   HGB 10.8* 10.9* 12.1       Resolved or declined bleeding episodes: Yes Last episode: 1400 -scant blood on toilet paper    Appropriate testing complete: No    Cleared for discharge by consultants (if involved): No    Safe discharge environment identified: Yes    Discharge Planner Nurse   Safe discharge environment identified: Yes  Barriers to discharge: Yes       Entered by: Ewa Maldonado RN 10/01/2023    Pt A&Ox4, calm and cooperative. SBA - lightheaded when up and moving. PIV LR continuous 125/hr. RA. No episodes of bleeding since arrival to unit - only pink toilet paper after wiping post void.  Tylenol given for headache. CIWA checks per protocol - previous scores 3,2 ue to headache. Na WDL at 138. Hgb 10.8 ( 12.1 in ED). TPump for back pain. Reg diet.  OBgyn to see tomorrow 10/2 to determine plan of care. Other wise monitor hgb.    SW following for travel insurance purposes.    Please review provider order for any additional goals.   Nurse to notify provider when observation goals have been met and patient is ready for discharge.

## 2023-10-01 NOTE — ED NOTES
Helped pt to the bedside commode. Pt tolerated activity well. Pt denies feeling lightheaded or dizzy. Pt had scant amount of blood with wiping

## 2023-10-01 NOTE — ED NOTES
"Pt helped to the bedside commode. Pt voids. Bleeding noted when she wipes. Pt reports she feels \"out of sorts\" pt denies feeling nauseated. Pt denies pain  "

## 2023-10-02 VITALS
RESPIRATION RATE: 18 BRPM | HEART RATE: 74 BPM | WEIGHT: 177.8 LBS | HEIGHT: 66 IN | SYSTOLIC BLOOD PRESSURE: 160 MMHG | DIASTOLIC BLOOD PRESSURE: 88 MMHG | BODY MASS INDEX: 28.57 KG/M2 | TEMPERATURE: 98.4 F | OXYGEN SATURATION: 96 %

## 2023-10-02 LAB
ALBUMIN UR-MCNC: NEGATIVE MG/DL
ANION GAP SERPL CALCULATED.3IONS-SCNC: 8 MMOL/L (ref 7–15)
APPEARANCE UR: CLEAR
ATRIAL RATE - MUSE: 57 BPM
BILIRUB UR QL STRIP: NEGATIVE
BUN SERPL-MCNC: 9.7 MG/DL (ref 8–23)
CALCIUM SERPL-MCNC: 8.9 MG/DL (ref 8.8–10.2)
CHLORIDE SERPL-SCNC: 106 MMOL/L (ref 98–107)
COLOR UR AUTO: ABNORMAL
CREAT SERPL-MCNC: 0.67 MG/DL (ref 0.51–0.95)
DEPRECATED HCO3 PLAS-SCNC: 27 MMOL/L (ref 22–29)
DIASTOLIC BLOOD PRESSURE - MUSE: NORMAL MMHG
EGFRCR SERPLBLD CKD-EPI 2021: >90 ML/MIN/1.73M2
ERYTHROCYTE [DISTWIDTH] IN BLOOD BY AUTOMATED COUNT: 12.7 % (ref 10–15)
GLUCOSE SERPL-MCNC: 104 MG/DL (ref 70–99)
GLUCOSE UR STRIP-MCNC: NEGATIVE MG/DL
HCT VFR BLD AUTO: 35.2 % (ref 35–47)
HGB BLD-MCNC: 11.5 G/DL (ref 11.7–15.7)
HGB UR QL STRIP: NEGATIVE
INTERPRETATION ECG - MUSE: NORMAL
KETONES UR STRIP-MCNC: NEGATIVE MG/DL
LEUKOCYTE ESTERASE UR QL STRIP: ABNORMAL
MCH RBC QN AUTO: 31.9 PG (ref 26.5–33)
MCHC RBC AUTO-ENTMCNC: 32.7 G/DL (ref 31.5–36.5)
MCV RBC AUTO: 98 FL (ref 78–100)
NITRATE UR QL: NEGATIVE
P AXIS - MUSE: 61 DEGREES
PH UR STRIP: 7.5 [PH] (ref 5–7)
PLATELET # BLD AUTO: 215 10E3/UL (ref 150–450)
POTASSIUM SERPL-SCNC: 3.9 MMOL/L (ref 3.4–5.3)
PR INTERVAL - MUSE: 200 MS
QRS DURATION - MUSE: 82 MS
QT - MUSE: 424 MS
QTC - MUSE: 412 MS
R AXIS - MUSE: 33 DEGREES
RBC # BLD AUTO: 3.61 10E6/UL (ref 3.8–5.2)
RBC URINE: 1 /HPF
SODIUM SERPL-SCNC: 141 MMOL/L (ref 135–145)
SP GR UR STRIP: 1.01 (ref 1–1.03)
SQUAMOUS EPITHELIAL: 1 /HPF
SYSTOLIC BLOOD PRESSURE - MUSE: NORMAL MMHG
T AXIS - MUSE: 52 DEGREES
UROBILINOGEN UR STRIP-MCNC: NORMAL MG/DL
VENTRICULAR RATE- MUSE: 57 BPM
WBC # BLD AUTO: 5.4 10E3/UL (ref 4–11)
WBC URINE: 3 /HPF

## 2023-10-02 PROCEDURE — 81001 URINALYSIS AUTO W/SCOPE: CPT | Performed by: PHYSICIAN ASSISTANT

## 2023-10-02 PROCEDURE — 99239 HOSP IP/OBS DSCHRG MGMT >30: CPT | Performed by: PHYSICIAN ASSISTANT

## 2023-10-02 PROCEDURE — G0378 HOSPITAL OBSERVATION PER HR: HCPCS

## 2023-10-02 PROCEDURE — 36415 COLL VENOUS BLD VENIPUNCTURE: CPT | Performed by: STUDENT IN AN ORGANIZED HEALTH CARE EDUCATION/TRAINING PROGRAM

## 2023-10-02 PROCEDURE — 250N000013 HC RX MED GY IP 250 OP 250 PS 637: Performed by: STUDENT IN AN ORGANIZED HEALTH CARE EDUCATION/TRAINING PROGRAM

## 2023-10-02 PROCEDURE — 250N000013 HC RX MED GY IP 250 OP 250 PS 637: Performed by: HOSPITALIST

## 2023-10-02 PROCEDURE — 80048 BASIC METABOLIC PNL TOTAL CA: CPT | Performed by: STUDENT IN AN ORGANIZED HEALTH CARE EDUCATION/TRAINING PROGRAM

## 2023-10-02 PROCEDURE — 85027 COMPLETE CBC AUTOMATED: CPT | Performed by: STUDENT IN AN ORGANIZED HEALTH CARE EDUCATION/TRAINING PROGRAM

## 2023-10-02 RX ADMIN — ACETAMINOPHEN 650 MG: 325 TABLET, FILM COATED ORAL at 08:07

## 2023-10-02 RX ADMIN — THIAMINE HCL TAB 100 MG 100 MG: 100 TAB at 08:03

## 2023-10-02 RX ADMIN — PANTOPRAZOLE SODIUM 40 MG: 40 TABLET, DELAYED RELEASE ORAL at 08:03

## 2023-10-02 RX ADMIN — MULTIPLE VITAMINS W/ MINERALS TAB 1 TABLET: TAB at 08:03

## 2023-10-02 RX ADMIN — FOLIC ACID 1 MG: 1 TABLET ORAL at 08:03

## 2023-10-02 ASSESSMENT — ACTIVITIES OF DAILY LIVING (ADL)
ADLS_ACUITY_SCORE: 33
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
## 2023-10-02 NOTE — PLAN OF CARE
PRIMARY DIAGNOSIS: GI BLEED    OUTPATIENT/OBSERVATION GOALS TO BE MET BEFORE DISCHARGE  Orthostatic performed: No    Stable Hgb Yes.   Recent Labs   Lab Test 10/02/23  0714 10/01/23  2015 10/01/23  1045   HGB 11.5* 10.8* 10.8*       Resolved or declined bleeding episodes: Yes - scant blood on toilet paper     Appropriate testing complete: yes    Cleared for discharge by consultants (if involved): yes    Safe discharge environment identified: Yes    Discharge Planner Nurse   Safe discharge environment identified: Yes  Barriers to discharge: Yes       Entered by: Ewa Maldonado RN 10/02/2023 1:50 PM   Pt A&Ox4, calm and cooperative. SBA - lightheaded when up and moving.  RA. No episodes of bleeding since arrival to unit - only pink toilet paper after wiping post void.  Continues with mild headaches - states this is normal for her and she takes tylenol BID a home for this.  Reg diet.  OBgyn assessed.  Offered services in her clinic to the pt but pt declined and was going to follow up in her home town in [place].  However, her clinic can not provides the service ( D&C) for many months and the OBGYN provider states the pt needs the procedure asap. The pt requested to be seen in our OBGYN's clinic instead, so I call has been placed to set up an appt prior to discharge.  Otherwise pt is cleared to discharge to home.     Please review provider order for any additional goals.   Nurse to notify provider when observation goals have been met and patient is ready for discharge.

## 2023-10-02 NOTE — DISCHARGE SUMMARY
[facility]    Discharge Summary  Hospitalist    Date of Admission:  10/1/2023  Date of Discharge:  10/2/2023  Provider:  Lilo Costa PA-C  Date of Service (when I last saw the patient): 10/02/23    Discharge Diagnoses   Postmenopausal vaginal bleeding  Hypotension, resolved  Hypovolemic hyponatremia, resolved  Alcohol intoxication     Other medical issues:  GERD     History of Present Illness   Paula Pierson is an 68 year old female with no significant medical history who was admitted on 10/1/2023 for vaginal bleeding.      The patient is here visiting from [place].  She was out to dinner with some friends and drank several alcoholic beverages.  She went to the bathroom and noticed a gush of red blood come from her vagina.  She has never had this issue before.  Initially it was not clear if this was rectal bleeding or vaginal bleeding.  Later became more obvious that it was vaginal.  When she got home she had an episode of diarrhea and then noticed another gush of blood from her vagina.  She estimated that this was about 2 cups of blood.  She is not on blood thinners nor antiplatelet medication.  She does not take frequent ibuprofen.  She is not a daily drinker.  She is healthy and not on any medications chronically.  Due to these issues she comes to the ED for evaluation.     Here in the emergency department her temperature is 97.6, heart rate 61, blood pressure 150/99, respirate 16 and oxygen saturation 97% on room air.  Labs show sodium of 131 and a chloride of 93.  Remainder the BMP, liver function test, and troponin are normal.  CBC is normal.  Fecal occult blood is negative.  Pelvic ultrasound is shown below.  Blood alcohol 0.21. Please see the admission history and physical for full details.    Hospital Course   Paula Pierson was admitted on 10/1/2023.  The following problems were addressed during her hospitalization:    #Postmenopausal bleeding: postmenopausal for about 15 years.  No prior history of vaginal bleeding. Sudden onset without vaginal intercourse prior. No current or previous estrogen therapy. She had menarche at 11 years of age.  Her maternal grandmother had uterine cancer and her brother diagnosed with colon cancer about 2 years ago.  Differential include endometrial hyperplasia, endometrial cancer, leiomyoma.  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 Hemoglobin on admission was 12.1 decreased to 10.8.  She became hypotensive for some time but improved quickly with IV fluids.  Discussed her case with OB/GYN.  Likely outpatient work-up like D&C/endometrial biopsy unless she decompensates clinically.   -Gynecology consulted, recommended D&C for endometrium sampling and complete removal of thickened endometrium, unable to get this performed for approximately 8 months in [place], thus will contact gynecology here and likely perform before returning home to [place]   -if recurrent bleeding gynecology could prescribe Progesterone to help control   -[place] once home in order to seek medical attention urgently if needed  -outpatient ultrasound in 6 months to monitor stability of fibroid noted on ultrasound  -Hgb upward trending the day of discharge at 11.5    #Hypotension, resolved  She dropped her blood pressure to 85/45 while in the ED, improved with IVFs.  -S/p 2 L of IVF, stable off fluids and slightly increased prior to discharge   -tolerating adequate oral intake prior to discharge      #Hypovolemic hyponatremia, resolved  Sodium at 131 on admission, suspect from dehydration from alcohol consumption prior to admission. Received 2 L of normal saline in the ED.   -sodium normalized on recheck      #Alcohol intoxication  On admission alcohol level was 0.21.  Reported having 6 beers before coming to the hospital.  She states that she drinks  "alcohol occasionally and denied daily alcohol use.  No prior history of alcohol withdrawal.  -no evidence of withdrawal while admitted  -received thiamine and folate supplementation while admitted      #GERD  -Home pantoprazole 40 mg daily    Pending Results   None    Code Status   Full Code       Primary Care Physician   Provider Not In System    Exam:    BP (!) 160/88 (BP Location: Left arm)   Pulse 74   Temp 98.4  F (36.9  C) (Oral)   Resp 18   Ht 1.676 m (5' 6\")   Wt 80.6 kg (177 lb 12.8 oz)   SpO2 96%   BMI 28.70 kg/m    General: sitting up in bed, pleasant, interactive, NAD  Lungs: CTAB without wheezing or rales  CV: RRR without murmur or rub  GI: soft, nontender, nondistended  Skin: warm, dry, no lesions in visualized areas  Ext: mild LE edema   Neuro: no focal deficits     Discharge Disposition   Discharged to home    Consultations This Hospital Stay   GYNECOLOGY IP CONSULT  SOCIAL WORK IP CONSULT    Time Spent on this Encounter   I, Candace Costa PA-C, personally saw the patient today and spent greater than 30 minutes discharging this patient.    Discharge Orders      Reason for your hospital stay    You were admitted due to concerns for spontaneous vaginal bleeding.  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  Based on her ultrasound results there is concern for excess endometrial tissue that could be cancerous.  You were seen in consultation by gynecology who recommends a D&C which you prefer to do back in [place] rather than staying here to have this done.  Initially your blood pressure was low as well as your sodium level which improved from IV fluids.  Initial blood alcohol level was 0.21 and related to drinking prior to coming to the hospital.  It is recommended you decrease your alcohol intake and should not consume more than 2 drinks in 1 evening.  Will need close outpatient " follow-up upon discharge.  [place] case of recurrent significant bleeding that needs more urgent attention.     Follow-up and recommended labs and tests     Follow up with primary care provider back in [place] and discuss how to set up a D&C for tissue sampling and removal of thickened endometrial lining. Typically this would be performed within 1-2 weeks here so scheduling as soon as possible would be recommended.     Activity    Your activity upon discharge: activity as tolerated     When to contact your care team    Call your primary doctor or return to the emergency room if you have any of the following: temperature greater than 101, worsening abdominal pain, or increased vaginal bleeding.     Diet    Follow this diet upon discharge: Orders Placed This Encounter      Regular Diet Adult     Discharge Medications   Current Discharge Medication List        CONTINUE these medications which have NOT CHANGED    Details   pantoprazole (PROTONIX) 40 MG EC tablet Take 40 mg by mouth daily      UNABLE TO FIND Place 1 drop into both eyes every morning MEDICATION NAME: Duotrav eye drops           Allergies   No Known Allergies    Data   Results for orders placed or performed during the hospital encounter of 10/01/23   US Pelvic Complete with Transvaginal     Status: None    Narrative    EXAM: US PELVIC TRANSABDOMINAL AND TRANSVAGINAL  LOCATION: [facility]  DATE: 10/1/2023    INDICATION: vaginal bleeding  COMPARISON: None.  TECHNIQUE: Transabdominal scans were performed. Endovaginal ultrasound was performed to better visualize the adnexa.    FINDINGS:    UTERUS: 5.3 x 5.0 x 4.9 cm. 4.3 cm uniformly echogenic solid mass centered in right fundal myometrium is most consistent with lipoleiomyoma. The lipoleiomyoma may have small surface of submucosal extension.    ENDOMETRIUM: Abnormally thickened and mildly heterogeneous measuring  up to 12.6 mm wide. A discrete endometrial mass is not visualized.    RIGHT OVARY: 1.9 x 1.3 x 1.1 cm. Normal.    LEFT OVARY: 1.8 x 1.2 x 0.9 cm. Normal.    No significant free fluid.      Impression    IMPRESSION:  1.  Endometrium is abnormally thickened for postmenopausal status. This may be on the basis of hyperplasia. Follow-up recommended to exclude a proliferative process.  2.  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         Comprehensive metabolic panel     Status: Abnormal   Result Value Ref Range    Sodium 131 (L) 135 - 145 mmol/L    Potassium 3.8 3.4 - 5.3 mmol/L    Carbon Dioxide (CO2) 25 22 - 29 mmol/L    Anion Gap 13 7 - 15 mmol/L    Urea Nitrogen 13.2 8.0 - 23.0 mg/dL    Creatinine 0.76 0.51 - 0.95 mg/dL    GFR Estimate 84 >60 mL/min/1.73m2    Calcium 9.2 8.8 - 10.2 mg/dL    Chloride 93 (L) 98 - 107 mmol/L    Glucose 119 (H) 70 - 99 mg/dL    Alkaline Phosphatase 80 35 - 104 U/L    AST 17 0 - 45 U/L    ALT 13 0 - 50 U/L    Protein Total 7.1 6.4 - 8.3 g/dL    Albumin 4.5 3.5 - 5.2 g/dL    Bilirubin Total 0.3 <=1.2 mg/dL   Troponin T, High Sensitivity (now)     Status: Normal   Result Value Ref Range    Troponin T, High Sensitivity <6 <=14 ng/L   Alcohol level blood     Status: Abnormal   Result Value Ref Range    Alcohol ethyl 0.21 (H) <=0.01 g/dL   [facility] Draw     Status: None    Narrative    The following orders were created for panel order [facility] Draw.  Procedure                               Abnormality         Status                     ---------                               -----------         ------                     Extra Blue Top Tube[[id number]]                              Final result               Extra Blood Bank Purple ...[[id number]]                      Final result               Extra Blood Bank Purple ...[[id number]]                      Final result                 Please view results for these tests on the  individual orders.   CBC with platelets and differential     Status: Abnormal   Result Value Ref Range    WBC Count 7.8 4.0 - 11.0 10e3/uL    RBC Count 3.78 (L) 3.80 - 5.20 10e6/uL    Hemoglobin 12.1 11.7 - 15.7 g/dL    Hematocrit 35.7 35.0 - 47.0 %    MCV 94 78 - 100 fL    MCH 32.0 26.5 - 33.0 pg    MCHC 33.9 31.5 - 36.5 g/dL    RDW 12.3 10.0 - 15.0 %    Platelet Count 251 150 - 450 10e3/uL    % Neutrophils 32 %    % Lymphocytes 54 %    % Monocytes 9 %    % Eosinophils 4 %    % Basophils 1 %    % Immature Granulocytes 0 %    NRBCs per 100 WBC 0 <1 /100    Absolute Neutrophils 2.5 1.6 - 8.3 10e3/uL    Absolute Lymphocytes 4.2 0.8 - 5.3 10e3/uL    Absolute Monocytes 0.7 0.0 - 1.3 10e3/uL    Absolute Eosinophils 0.3 0.0 - 0.7 10e3/uL    Absolute Basophils 0.1 0.0 - 0.2 10e3/uL    Absolute Immature Granulocytes 0.0 <=0.4 10e3/uL    Absolute NRBCs 0.0 10e3/uL   Extra Blue Top Tube     Status: None   Result Value Ref Range    Hold Specimen [facility]    Extra Blood Bank Purple Top Tube     Status: None   Result Value Ref Range    Hold Specimen [facility]    Extra Blood Bank Purple Top Tube     Status: None   Result Value Ref Range    Hold Specimen [facility]    Stool: occult blood     Status: Normal   Result Value Ref Range    Occult Blood Negative Negative   iStat Basic Chem ICA Hematocrit, POCT     Status: Abnormal   Result Value Ref Range    Chloride POCT 99 94 - 109 mmol/L    Potassium POCT 3.6 3.4 - 5.3 mmol/L    Sodium POCT 134 133 - 144 mmol/L    UREA NITROGEN POCT 13 7 - 30 mg/dL    Calcium, Ionized Whole Blood POCT 4.5 4.4 - 5.2 mg/dL    Glucose Whole Blood POCT 102 (H) 70 - 99 mg/dL    Anion Gap POCT 18.0 (H) 3.0 - 14.0 mmol/L    Hemoglobin POCT 10.9 (L) 11.7 - 15.7 g/dL    Hematocrit POCT 32 (L) 35 - 47 %    Creatinine POCT 0.9 0.5 - 1.0 mg/dL    TOTAL CO2 POCT 22 20 - 32 mmol/L   CBC with platelets     Status: Abnormal   Result Value Ref Range    WBC Count 7.4 4.0 - 11.0 10e3/uL    RBC Count 3.41 (L) 3.80 - 5.20 10e6/uL     Hemoglobin 10.8 (L) 11.7 - 15.7 g/dL    Hematocrit 32.7 (L) 35.0 - 47.0 %    MCV 96 78 - 100 fL    MCH 31.7 26.5 - 33.0 pg    MCHC 33.0 31.5 - 36.5 g/dL    RDW 12.5 10.0 - 15.0 %    Platelet Count 238 150 - 450 10e3/uL   Sodium     Status: Normal   Result Value Ref Range    Sodium 138 135 - 145 mmol/L   Magnesium     Status: Normal   Result Value Ref Range    Magnesium 1.7 1.7 - 2.3 mg/dL   Phosphorus     Status: Normal   Result Value Ref Range    Phosphorus 2.7 2.5 - 4.5 mg/dL   Hemoglobin     Status: Abnormal   Result Value Ref Range    Hemoglobin 10.8 (L) 11.7 - 15.7 g/dL   CBC with platelets     Status: Abnormal   Result Value Ref Range    WBC Count 5.4 4.0 - 11.0 10e3/uL    RBC Count 3.61 (L) 3.80 - 5.20 10e6/uL    Hemoglobin 11.5 (L) 11.7 - 15.7 g/dL    Hematocrit 35.2 35.0 - 47.0 %    MCV 98 78 - 100 fL    MCH 31.9 26.5 - 33.0 pg    MCHC 32.7 31.5 - 36.5 g/dL    RDW 12.7 10.0 - 15.0 %    Platelet Count 215 150 - 450 10e3/uL   Basic metabolic panel     Status: Abnormal   Result Value Ref Range    Sodium 141 135 - 145 mmol/L    Potassium 3.9 3.4 - 5.3 mmol/L    Chloride 106 98 - 107 mmol/L    Carbon Dioxide (CO2) 27 22 - 29 mmol/L    Anion Gap 8 7 - 15 mmol/L    Urea Nitrogen 9.7 8.0 - 23.0 mg/dL    Creatinine 0.67 0.51 - 0.95 mg/dL    GFR Estimate >90 >60 mL/min/1.73m2    Calcium 8.9 8.8 - 10.2 mg/dL    Glucose 104 (H) 70 - 99 mg/dL   EKG 12-lead, tracing only     Status: None   Result Value Ref Range    Systolic Blood Pressure  mmHg    Diastolic Blood Pressure  mmHg    Ventricular Rate 57 BPM    Atrial Rate 57 BPM    KY Interval 200 ms    QRS Duration 82 ms     ms    QTc 412 ms    P Axis 61 degrees    R AXIS 33 degrees    T Axis 52 degrees    Interpretation ECG       Sinus bradycardia  Otherwise normal ECG  No previous ECGs available     Adult Type and Screen     Status: None   Result Value Ref Range    ABO/RH(D) O POS     Antibody Screen Negative Negative    SPECIMEN EXPIRATION DATE [id number]     CBC + differential     Status: Abnormal    Narrative    The following orders were created for panel order CBC + differential.  Procedure                               Abnormality         Status                     ---------                               -----------         ------                     CBC with platelets and d...[[id number]]  Abnormal            Final result                 Please view results for these tests on the individual orders.   ABO/Rh type and screen     Status: None    Narrative    The following orders were created for panel order ABO/Rh type and screen.  Procedure                               Abnormality         Status                     ---------                               -----------         ------                     Adult Type and Screen[[id number]]                            Final result                 Please view results for these tests on the individual orders.     Candace Costa PA-C

## 2023-10-02 NOTE — PLAN OF CARE
PRIMARY DIAGNOSIS: GI BLEED    OUTPATIENT/OBSERVATION GOALS TO BE MET BEFORE DISCHARGE  Orthostatic performed: No    Stable Hgb Yes.   Recent Labs   Lab Test 10/02/23  0714 10/01/23  2015 10/01/23  1045   HGB 11.5* 10.8* 10.8*       Resolved or declined bleeding episodes: Yes - scant blood on toilet paper     Appropriate testing complete: No    Cleared for discharge by consultants (if involved): No    Safe discharge environment identified: Yes    Discharge Planner Nurse   Safe discharge environment identified: Yes  Barriers to discharge: Yes       Entered by: Ewa Maldonado RN 10/02/2023 10:05 AM   Pt A&Ox4, calm and cooperative. SBA - lightheaded when up and moving. PIV RA SL. RA. No episodes of bleeding since arrival to unit - only pink toilet paper after wiping post void.  Continues with mild headaches - states this is normal for her and she takes tylenol BID a home for this. CIWA checks per protocol - previous scores 3,2 due to headache/ no other symptoms. Morning labs stable, hgb 11.5 ( 10.8 yesterday).  TPump for back pain. Reg diet.  OBgyn to see today.      Please review provider order for any additional goals.   Nurse to notify provider when observation goals have been met and patient is ready for discharge.

## 2023-10-02 NOTE — CONSULTS
"Care Management Discharge Note    Discharge Date: 10/02/2023     Discharge Disposition: Dtr's home / return to [place]    Discharge Services:      Discharge DME:      Discharge Transportation:  family or friend to provide    Private pay costs discussed: Not applicable    Patient/Family in Agreement with the Plan:  yes    Handoff Referral Completed: No    Additional Information:  Received SW consult \"Patient is from [place] and has travel insurance however does not know how would this work\". Chart reviewed. Appears registration spoke with pt and at the time pt did not have her card with her. Plan was made for pt to contact billing to call in with her information when she had the card available. If pt has specific coverage questions she should reach out to her (travel) insurance company.     Pt has discharge orders. No needs identified. Please alert SW or place new consult if needs arise.     LARA Mcclellan, W  Care Coordination  [phone]    LARA Granados      "

## 2023-10-02 NOTE — PLAN OF CARE
"Patient's After Visit Summary was reviewed with patient.   Patient verbalized understanding of After Visit Summary, recommended follow up and was given an opportunity to ask questions.   Discharge medications sent home with patient/family: Not applicable   Discharged with son      Pt well and VSS upon departure from room 203. All belongings and paperwork sent home with pt.     BP (!) 160/88 (BP Location: Left arm)   Pulse 74   Temp 98.4  F (36.9  C) (Oral)   Resp 18   Ht 1.676 m (5' 6\")   Wt 80.6 kg (177 lb 12.8 oz)   SpO2 96%   BMI 28.70 kg/m      OBSERVATION patient END time: 1515    "

## 2023-10-02 NOTE — PLAN OF CARE
PRIMARY DIAGNOSIS: GI BLEED     OUTPATIENT/OBSERVATION GOALS TO BE MET BEFORE DISCHARGE  Orthostatic performed: No     Stable Hgb No        Recent Labs   Lab Test 10/01/23  2015 10/01/23  1045 10/01/23  0520   HGB 10.8* 10.8* 10.9*         Resolved or declined bleeding episodes: Yes Last episode: 2145 scant      Appropriate testing complete: No     Cleared for discharge by consultants (if involved): No     Safe discharge environment identified: Yes     Discharge Planner Nurse   Safe discharge environment identified: Yes  Barriers to discharge: Yes         Entered by: Natividad Foster, RN      Pt alert and oriented x 4. Denies pain. Up with SBA. Tolerating diet. PIV saline locked. CIWA score 1, 1. OB/GYN consult pending. SW following.     Please review provider order for any additional goals.   Nurse to notify provider when observation goals have been met and patient is ready for discharge.

## 2023-10-02 NOTE — PLAN OF CARE
PRIMARY DIAGNOSIS: GI BLEED     OUTPATIENT/OBSERVATION GOALS TO BE MET BEFORE DISCHARGE  Orthostatic performed: No     Stable Hgb No            Recent Labs   Lab Test 10/01/23  2015 10/01/23  1045 10/01/23  0520   HGB 10.8* 10.8* 10.9*         Resolved or declined bleeding episodes: Yes Last episode: 2145 scant      Appropriate testing complete: No     Cleared for discharge by consultants (if involved): No     Safe discharge environment identified: Yes     Discharge Planner Nurse   Safe discharge environment identified: Yes  Barriers to discharge: Yes          Entered by: Natividad Foster, RN       Pt alert and oriented x 4. Denies pain. Up with SBA. Tolerating diet. PIV saline locked. CIWA score 1, 1, 0. OB/GYN consult pending. SW following.      Please review provider order for any additional goals.   Nurse to notify provider when observation goals have been met and patient is ready for discharge.

## 2023-10-02 NOTE — PLAN OF CARE
PRIMARY DIAGNOSIS: GI BLEED    OUTPATIENT/OBSERVATION GOALS TO BE MET BEFORE DISCHARGE  Orthostatic performed: No    Stable Hgb No  Recent Labs   Lab Test 10/01/23  2015 10/01/23  1045 10/01/23  0520   HGB 10.8* 10.8* 10.9*       Resolved or declined bleeding episodes: Yes Last episode: 1800 scant     Appropriate testing complete: No    Cleared for discharge by consultants (if involved): No    Safe discharge environment identified: Yes    Discharge Planner Nurse   Safe discharge environment identified: Yes  Barriers to discharge: Yes       Entered by: Natividad Foster RN 10/01/2023 9:49 PM   Pt alert and oriented x 4. Denies pain. States feeling tired than usual. Up with SBA. Tolerating diet. PIV saline locked. CIWA score 1. OB/GYN consult pending. SW following.   Please review provider order for any additional goals.   Nurse to notify provider when observation goals have been met and patient is ready for discharge.

## 2023-10-03 ENCOUNTER — LAB REQUISITION (OUTPATIENT)
Dept: LAB | Facility: CLINIC | Age: 68
End: 2023-10-03

## 2023-10-03 DIAGNOSIS — N95.0 POSTMENOPAUSAL BLEEDING: ICD-10-CM

## 2023-10-03 PROCEDURE — 88305 TISSUE EXAM BY PATHOLOGIST: CPT | Performed by: PATHOLOGY

## 2023-10-05 LAB
PATH REPORT.COMMENTS IMP SPEC: NORMAL
PATH REPORT.COMMENTS IMP SPEC: NORMAL
PATH REPORT.FINAL DX SPEC: NORMAL
PATH REPORT.GROSS SPEC: NORMAL
PATH REPORT.MICROSCOPIC SPEC OTHER STN: NORMAL
PHOTO IMAGE: NORMAL